# Patient Record
Sex: MALE
[De-identification: names, ages, dates, MRNs, and addresses within clinical notes are randomized per-mention and may not be internally consistent; named-entity substitution may affect disease eponyms.]

---

## 2024-02-12 ENCOUNTER — APPOINTMENT (OUTPATIENT)
Dept: OTOLARYNGOLOGY | Facility: CLINIC | Age: 39
End: 2024-02-12
Payer: COMMERCIAL

## 2024-02-12 DIAGNOSIS — H90.11 CONDUCTIVE HEARING LOSS, UNILATERAL, RIGHT EAR, WITH UNRESTRICTED HEARING ON THE CONTRALATERAL SIDE: ICD-10-CM

## 2024-02-12 DIAGNOSIS — H71.91 UNSPECIFIED CHOLESTEATOMA, RIGHT EAR: ICD-10-CM

## 2024-02-12 DIAGNOSIS — H70.11 CHRONIC MASTOIDITIS, RIGHT EAR: ICD-10-CM

## 2024-02-12 PROBLEM — Z00.00 ENCOUNTER FOR PREVENTIVE HEALTH EXAMINATION: Status: ACTIVE | Noted: 2024-02-12

## 2024-02-12 PROCEDURE — 92557 COMPREHENSIVE HEARING TEST: CPT

## 2024-02-12 PROCEDURE — 99204 OFFICE O/P NEW MOD 45 MIN: CPT | Mod: 25

## 2024-02-12 PROCEDURE — 69222 CLEAN OUT MASTOID CAVITY: CPT

## 2024-02-12 PROCEDURE — 92567 TYMPANOMETRY: CPT

## 2024-02-12 NOTE — ASSESSMENT
[FreeTextEntry1] : History of cholesteatoma status post mastoidectomy approximately 18 months ago.  Now with active inflammation of the mastoid cavity.  I have reviewed optimized management of his condition.  I have recommended water protection.  I have recommended use of otic drops as needed for otorrhea.  I have referred him for CT scan evaluation.  Clinical monitoring advised with follow-up in 6 months and as needed.

## 2024-02-12 NOTE — CONSULT LETTER
[Please see my note below.] : Please see my note below. [FreeTextEntry2] : Dear Dr. Beni Andrews [FreeTextEntry1] : Thank you for allowing me to participate in the care of KRISTINA FOSTER . Please see the attached visit note.    [facility] Medical Director of [facility] Department of Otolaryngology [facility]

## 2024-02-12 NOTE — HISTORY OF PRESENT ILLNESS
[de-identified] : KRISTINA FOSTER has a history of right mastoiditis presenting with right ear pain in or about 2021.  Later diagnosed with cholesteatoma and treated surgically 2022.   Recurrent foul smelling otorrhea intermittently. Also reports recent pulsatile tinnitus in the right ear recently; better after tx witn otic drops. On neomycin drops. No vertigo.  Childhood history of ear infections including ventilation tubes

## 2024-02-14 PROBLEM — H90.11 CONDUCTIVE HEARING LOSS OF RIGHT EAR, UNSPECIFIED HEARING STATUS ON CONTRALATERAL SIDE: Status: ACTIVE | Noted: 2024-02-12

## 2024-02-14 RX ORDER — OFLOXACIN OTIC 3 MG/ML
0.3 SOLUTION AURICULAR (OTIC) TWICE DAILY
Qty: 1 | Refills: 2 | Status: ACTIVE | COMMUNITY
Start: 2024-02-14 | End: 1900-01-01

## 2024-03-26 ENCOUNTER — APPOINTMENT (OUTPATIENT)
Dept: CT IMAGING | Facility: CLINIC | Age: 39
End: 2024-03-26

## 2024-08-12 ENCOUNTER — APPOINTMENT (OUTPATIENT)
Dept: OTOLARYNGOLOGY | Facility: CLINIC | Age: 39
End: 2024-08-12

## 2025-04-20 ENCOUNTER — NON-APPOINTMENT (OUTPATIENT)
Age: 40
End: 2025-04-20